# Patient Record
Sex: FEMALE | Race: WHITE
[De-identification: names, ages, dates, MRNs, and addresses within clinical notes are randomized per-mention and may not be internally consistent; named-entity substitution may affect disease eponyms.]

---

## 2018-02-07 ENCOUNTER — HOSPITAL ENCOUNTER (EMERGENCY)
Dept: HOSPITAL 74 - JER | Age: 28
Discharge: HOME | End: 2018-02-07
Payer: COMMERCIAL

## 2018-02-07 VITALS — DIASTOLIC BLOOD PRESSURE: 87 MMHG | HEART RATE: 96 BPM | SYSTOLIC BLOOD PRESSURE: 129 MMHG | TEMPERATURE: 98.8 F

## 2018-02-07 VITALS — BODY MASS INDEX: 22.6 KG/M2

## 2018-02-07 DIAGNOSIS — J11.1: Primary | ICD-10-CM

## 2018-02-07 NOTE — PDOC
History of Present Illness





- General


Stated Complaint: FEVER,SORE THROAT


Time Seen by Provider: 02/07/18 06:01


History Source: Patient


Exam Limitations: No Limitations





- History of Present Illness


Initial Comments: 





CHIEF COMPLAINT:  29 y/o female with no significant PMH c/o flu like symptoms 

since yesterday. 





HISTORY OF PRESENT ILLNESS:  The patient admits out of nowhere yesterday she 

developed body aches, fever, cough, runny nose and sore throat.  she took 

nyquil with no relief.  She did not have the flu shot this year. 





Vital signs on arrival are notable for 





REVIEW OF SYSTEMS:


GENERAL/CONSTITUTIONAL: Subjective fever/chills. +body aches. No weakness. No 

weight change.


HEAD, EYES, EARS, NOSE AND THROAT: No change in vision. No ear pain or 

discharge. + sore throat.


CARDIOVASCULAR: No chest pain or shortness of breath.


RESPIRATORY: +dry cought. No wheezing, or hemoptysis.


GASTROINTESTINAL: No abd pain, nausea, vomiting, diarrhea. 


GENITOURINARY: No dysuria, frequency, or change in urination.


MUSCULOSKELETAL: No joint or muscle swelling or pain. No neck or back pain.


SKIN: No rash or easy bruising.


NEUROLOGIC: No headache, vertigo, loss of consciousness, or loss of sensation.








PHYSICAL EXAM:


GENERAL: The patient is awake, alert, and fully oriented, in no acute distress.

  She is non toxic but ill appearing. 


HEAD: Normal with no signs of trauma.


ENT: Pupils equal, round and reactive to light, extraocular movements intact, 

sclera anicteric, conjunctiva clear.  Erythematous tonsils without edema or 

exudate.  Uvula midline. 


LUNGS: Clear to auscultation bilaterally. Normal excursion. No respiratory 

distress or use of accessory muscles.


CV: RRR, S1/S2, no MRG. Cap refill < 2 sec.


ABDOMEN: Soft, non-distended, non-tender even to deep palpation, no 

hepatomegaly or splenomegaly, no masses.


EXTREMITIES: Normal range of motion, no edema.


NEUROLOGICAL: Normal speech, normal gait. CN II-XII grossly intact.


SKIN: Warm, dry, normal turgor, no rashes or lesions noted.











Past History





- Past Medical History


Allergies/Adverse Reactions: 


 Allergies











Allergy/AdvReac Type Severity Reaction Status Date / Time


 


No Known Allergies Allergy   Verified 05/10/12 02:34











Home Medications: 


Ambulatory Orders





NK [No Known Home Medication]  02/07/18 











- Suicide/Smoking/Psychosocial Hx


Smoking Status: Yes


Number of Cigarettes Smoked Daily: 2





Medical Decision Making





- Medical Decision Making





A/P:  29 y/o female with clinical flu.  Will check strep.





Rapid strep - negative





Patient will be discharged with diagnosis of the flu.  will send tamiflu.  

instructed her to alternate between tylenol and motrin every 3 hours for fever 

and body aches.  suggested plenty of fluids and rest and return to the ER with 

any worsening or concerning symptoms. 





The patient verbalizes understanding of all instructions, has no further 

questions and is awaiting discharge.








*DC/Admit/Observation/Transfer


Diagnosis at time of Disposition: 


 Influenza








- Discharge Dispostion


Disposition: HOME


Condition at time of disposition: Stable





- Referrals


Referrals: 


Allen Mascorro MD [Primary Care Provider] - 





- Patient Instructions


Printed Discharge Instructions:  [facility] for Influenza -- Adult


Additional Instructions: 


Discharge instructions:


-You have the flu


-A prescription has been sent to your pharmacy


-Alternate between 650mg of tylenol and 600mg of motrin every 3 hours for fever/

body aches


-Drink plenty of liquids and get lots of rest


-Return to the ER with any worsening or concerning symptoms





- Post Discharge Activity


Forms/Work/School Notes:  Back to Work

## 2022-06-09 ENCOUNTER — HOSPITAL ENCOUNTER (OUTPATIENT)
Dept: HOSPITAL 74 - JASU-SURG | Age: 32
Discharge: HOME | End: 2022-06-09
Attending: UROLOGY
Payer: COMMERCIAL

## 2022-06-09 VITALS — SYSTOLIC BLOOD PRESSURE: 112 MMHG | TEMPERATURE: 98.1 F | DIASTOLIC BLOOD PRESSURE: 61 MMHG | HEART RATE: 74 BPM

## 2022-06-09 VITALS — BODY MASS INDEX: 25.2 KG/M2

## 2022-06-09 DIAGNOSIS — N30.10: Primary | ICD-10-CM

## 2022-06-09 DIAGNOSIS — R35.0: ICD-10-CM

## 2022-06-09 PROCEDURE — 3E0K8GC INTRODUCTION OF OTHER THERAPEUTIC SUBSTANCE INTO GENITOURINARY TRACT, VIA NATURAL OR ARTIFICIAL OPENING ENDOSCOPIC: ICD-10-PCS | Performed by: UROLOGY

## 2022-06-09 PROCEDURE — 0T7B8ZZ DILATION OF BLADDER, VIA NATURAL OR ARTIFICIAL OPENING ENDOSCOPIC: ICD-10-PCS | Performed by: UROLOGY

## 2024-10-21 ENCOUNTER — HOSPITAL ENCOUNTER (EMERGENCY)
Dept: HOSPITAL 74 - JER | Age: 34
Discharge: HOME | End: 2024-10-21
Payer: COMMERCIAL

## 2024-10-21 VITALS
SYSTOLIC BLOOD PRESSURE: 125 MMHG | DIASTOLIC BLOOD PRESSURE: 85 MMHG | TEMPERATURE: 97.7 F | RESPIRATION RATE: 20 BRPM | HEART RATE: 75 BPM

## 2024-10-21 VITALS — BODY MASS INDEX: 25.4 KG/M2

## 2024-10-21 DIAGNOSIS — R05.9: ICD-10-CM

## 2024-10-21 DIAGNOSIS — U07.1: ICD-10-CM

## 2024-10-21 DIAGNOSIS — R06.02: Primary | ICD-10-CM

## 2025-07-01 ENCOUNTER — HOSPITAL ENCOUNTER (EMERGENCY)
Dept: HOSPITAL 74 - JERFT | Age: 35
Discharge: HOME | End: 2025-07-01
Payer: COMMERCIAL

## 2025-07-01 VITALS — BODY MASS INDEX: 26.6 KG/M2

## 2025-07-01 VITALS
SYSTOLIC BLOOD PRESSURE: 114 MMHG | RESPIRATION RATE: 18 BRPM | DIASTOLIC BLOOD PRESSURE: 69 MMHG | TEMPERATURE: 98.2 F | HEART RATE: 69 BPM
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
## 2025-07-01 DIAGNOSIS — S61.012A: Primary | ICD-10-CM
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
## 2025-07-01 PROCEDURE — 0HQGXZZ REPAIR LEFT HAND SKIN, EXTERNAL APPROACH: ICD-10-PCS | Performed by: EMERGENCY MEDICINE

## 2025-07-01 RX ADMIN — IBUPROFEN ONE MG: 600 TABLET, FILM COATED ORAL at 20:39
